# Patient Record
Sex: MALE | Race: WHITE
[De-identification: names, ages, dates, MRNs, and addresses within clinical notes are randomized per-mention and may not be internally consistent; named-entity substitution may affect disease eponyms.]

---

## 2018-05-22 ENCOUNTER — HOSPITAL ENCOUNTER (EMERGENCY)
Dept: HOSPITAL 56 - MW.ED | Age: 38
Discharge: HOME | End: 2018-05-22
Payer: COMMERCIAL

## 2018-05-22 DIAGNOSIS — I10: ICD-10-CM
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
## 2018-05-22 DIAGNOSIS — S01.01XA: Primary | ICD-10-CM

## 2018-05-22 DIAGNOSIS — S09.90XA: ICD-10-CM

## 2018-05-22 NOTE — EDM.PDOC
ED HPI GENERAL MEDICAL PROBLEM





- General


Chief Complaint: Laceration


Stated Complaint: LACERATION TO THE BACK OF HEAD


Time Seen by Provider: 05/22/18 09:12





- History of Present Illness


INITIAL COMMENTS - FREE TEXT/NARRATIVE: 





HISTORY AND PHYSICAL:





History of present illness:


Patient is a 38-year-old white male gentleman concern of scalp wound that 

occurred when a piece of fence fell hitting the top is no loss consciousness no 

other tremor concern he is up-to-date on his tetanus





Review of systems: 


As per history of present illness and below otherwise all systems reviewed and 

negative.





Past medical history: 


As per history of present illness and as reviewed below otherwise 

noncontributory.





Surgical history: 


As per history of present illness and as reviewed below otherwise 

noncontributory.





Social history: 


No reported history of drug or alcohol abuse.





Family history: 


As per history of present illness and as reviewed below otherwise 

noncontributory.





Physical exam:


HEENT: Patient has an abrasion with superficial laceration at the apex of the 

scalp no step-off no depression or crepitation orbits foreign body is good 

hemostasis, normocephalic, pupils reactive, negative for conjunctival pallor or 

scleral icterus, mucous membranes moist, throat clear, neck supple, nontender, 

trachea midline.


Lungs: Clear to auscultation, breath sounds equal bilaterally, chest nontender.


Heart: S1S2, regular, negative for clicks, rubs, or JVD.


Abdomen: Soft, nondistended, nontender. Negative for masses or 

hepatosplenomegaly. Negative for costovertebral tenderness.


Pelvis: Stable nontender.


Genitourinary: Deferred.


Rectal: Deferred.


Extremities: Atraumatic, negative for cords or calf pain. Neurovascular 

unremarkable.


Neuro: Awake, alert, oriented. Cranial nerves II through XII unremarkable. 

Cerebellum unremarkable. Motor and sensory unremarkable throughout. Exam 

nonfocal.





Diagnostics:


None





Therapeutics:


Was irrigated and dressed with bacitracin





Impression: 


#1 minor head injury with scalp wound





Definitive disposition and diagnosis as appropriate pending reevaluation and 

review of above.


  ** Head


Pain Score (Numeric/FACES): 3





- Related Data


 Allergies











Allergy/AdvReac Type Severity Reaction Status Date / Time


 


No Known Allergies Allergy   Verified 05/22/18 08:57











Home Meds: 


 Home Meds





PARoxetine [Paxil] 10 mg PO DAILY 05/22/18 [History]


amLODIPine [Norvasc] 2.5 mg PO DAILY 05/22/18 [History]











Past Medical History


Cardiovascular History: Reports: Hypertension





Social & Family History





- Tobacco Use


Smoking Status *Q: Never Smoker


Second Hand Smoke Exposure: No





- Recreational Drug Use


Recreational Drug Use: No





ED ROS GENERAL





- Review of Systems


Review Of Systems: ROS reveals no pertinent complaints other than HPI.





ED EXAM, SKIN/RASH


Exam: See Below (See dictation)





Course





- Vital Signs


Last Recorded V/S: 





 Last Vital Signs











Temp  36.0 C   05/22/18 08:57


 


Pulse  66   05/22/18 08:57


 


Resp  18   05/22/18 08:57


 


BP  177/95 H  05/22/18 08:57


 


Pulse Ox  96   05/22/18 08:57














- Orders/Labs/Meds


Meds: 





Medications














Discontinued Medications














Generic Name Dose Route Start Last Admin





  Trade Name Freq  PRN Reason Stop Dose Admin


 


Bacitracin  1 dose  05/22/18 09:11  





  Bacitracin Oint 1 Gm  TOP  05/22/18 09:12  





  ONETIME ONE   





     





     





     





     














Departure





- Departure


Time of Disposition: 09:14


Disposition: Home, Self-Care 01


Clinical Impression: 


 Head injury








- Discharge Information


Referrals: 


Maryana Roberts NP [Primary Care Provider] - 


Additional Instructions: 


The following information is given to patients seen in the emergency department 

who are being discharged to home. This information is to outline your options 

for follow-up care. We provide all patients seen in our emergency department 

with a follow-up referral.





The need for follow-up, as well as the timing and circumstances, are variable 

depending upon the specifics of your emergency department visit.





If you don't have a primary care physician on staff, we will provide you with a 

referral. We always advise you to contact your personal physician following an 

emergency department visit to inform them of the circumstance of the visit and 

for follow-up with them and/or the need for any referrals to a consulting 

specialist.





The emergency department will also refer you to a specialist when appropriate. 

This referral assures that you have the opportunity for followup care with a 

specialist. All of these measure are taken in an effort to provide you with 

optimal care, which includes your followup.





Under all circumstances we always encourage you to contact your private 

physician who remains a resource for coordinating  your care. When calling for 

followup care, please make the office aware that this follow-up is from your 

recent emergency room visit. If for any reason you are refused follow-up, 

please contact the [facility] emergency department at [phone] 

and asked to speak to the emergency department charge nurse.























Follow-up primary medical doctor as needed as discussed wound care is discussed 

return as needed as discussed